# Patient Record
Sex: MALE | Race: WHITE | URBAN - METROPOLITAN AREA
[De-identification: names, ages, dates, MRNs, and addresses within clinical notes are randomized per-mention and may not be internally consistent; named-entity substitution may affect disease eponyms.]

---

## 2018-07-12 ENCOUNTER — OUTPATIENT (OUTPATIENT)
Dept: OUTPATIENT SERVICES | Facility: HOSPITAL | Age: 43
LOS: 1 days | Discharge: HOME | End: 2018-07-12

## 2018-07-13 ENCOUNTER — OUTPATIENT (OUTPATIENT)
Dept: OUTPATIENT SERVICES | Facility: HOSPITAL | Age: 43
LOS: 1 days | Discharge: HOME | End: 2018-07-13

## 2018-07-13 DIAGNOSIS — Z48.01 ENCOUNTER FOR CHANGE OR REMOVAL OF SURGICAL WOUND DRESSING: ICD-10-CM

## 2018-07-18 DIAGNOSIS — K05.6 PERIODONTAL DISEASE, UNSPECIFIED: ICD-10-CM

## 2022-06-27 PROBLEM — Z00.00 ENCOUNTER FOR PREVENTIVE HEALTH EXAMINATION: Status: ACTIVE | Noted: 2022-06-27

## 2023-01-13 ENCOUNTER — APPOINTMENT (OUTPATIENT)
Dept: INTERNAL MEDICINE | Facility: CLINIC | Age: 48
End: 2023-01-13

## 2023-04-07 ENCOUNTER — APPOINTMENT (OUTPATIENT)
Dept: INTERNAL MEDICINE | Facility: CLINIC | Age: 48
End: 2023-04-07

## 2023-12-13 ENCOUNTER — OUTPATIENT (OUTPATIENT)
Dept: OUTPATIENT SERVICES | Facility: HOSPITAL | Age: 48
LOS: 1 days | End: 2023-12-13
Payer: COMMERCIAL

## 2023-12-13 ENCOUNTER — APPOINTMENT (OUTPATIENT)
Dept: INTERNAL MEDICINE | Facility: CLINIC | Age: 48
End: 2023-12-13
Payer: COMMERCIAL

## 2023-12-13 VITALS
SYSTOLIC BLOOD PRESSURE: 141 MMHG | BODY MASS INDEX: 26.37 KG/M2 | DIASTOLIC BLOOD PRESSURE: 82 MMHG | HEART RATE: 66 BPM | HEIGHT: 68 IN | WEIGHT: 174 LBS | OXYGEN SATURATION: 100 % | TEMPERATURE: 97.6 F

## 2023-12-13 DIAGNOSIS — Z00.00 ENCOUNTER FOR GENERAL ADULT MEDICAL EXAMINATION WITHOUT ABNORMAL FINDINGS: ICD-10-CM

## 2023-12-13 DIAGNOSIS — Z00.00 ENCOUNTER FOR GENERAL ADULT MEDICAL EXAMINATION W/OUT ABNORMAL FINDINGS: ICD-10-CM

## 2023-12-13 DIAGNOSIS — F17.200 NICOTINE DEPENDENCE, UNSPECIFIED, UNCOMPLICATED: ICD-10-CM

## 2023-12-13 DIAGNOSIS — Z23 ENCOUNTER FOR IMMUNIZATION: ICD-10-CM

## 2023-12-13 PROCEDURE — 99203 OFFICE O/P NEW LOW 30 MIN: CPT

## 2023-12-13 PROCEDURE — 85027 COMPLETE CBC AUTOMATED: CPT

## 2023-12-13 PROCEDURE — 80061 LIPID PANEL: CPT

## 2023-12-13 PROCEDURE — 83036 HEMOGLOBIN GLYCOSYLATED A1C: CPT

## 2023-12-13 PROCEDURE — 84443 ASSAY THYROID STIM HORMONE: CPT

## 2023-12-13 PROCEDURE — 80053 COMPREHEN METABOLIC PANEL: CPT

## 2023-12-13 NOTE — REVIEW OF SYSTEMS
[Fever] : no fever [Fatigue] : no fatigue [Night Sweats] : no night sweats [Recent Change In Weight] : ~T no recent weight change [Discharge] : no discharge [Pain] : no pain [Redness] : no redness [Dryness] : no dryness [Vision Problems] : no vision problems [Itching] : no itching [Earache] : no earache [Hearing Loss] : no hearing loss [Nosebleeds] : no nosebleeds [Postnasal Drip] : no postnasal drip [Nasal Discharge] : no nasal discharge [Sore Throat] : no sore throat [Chest Pain] : no chest pain [Palpitations] : no palpitations [Lower Ext Edema] : no lower extremity edema [Orthopena] : no orthopnea [Paroxysmal Nocturnal Dyspnea] : no paroxysmal nocturnal dyspnea [Shortness Of Breath] : no shortness of breath [Wheezing] : no wheezing [Cough] : no cough [Dyspnea on Exertion] : not dyspnea on exertion [Abdominal Pain] : no abdominal pain [Nausea] : no nausea [Constipation] : no constipation [Diarrhea] : no diarrhea [Vomiting] : no vomiting [Heartburn] : no heartburn [Melena] : no melena [Dysuria] : no dysuria [Incontinence] : no incontinence [Hematuria] : no hematuria [Joint Pain] : no joint pain [Muscle Pain] : no muscle pain [Back Pain] : no back pain [Headache] : no headache [Dizziness] : no dizziness [Fainting] : no fainting [Unsteady Walk] : no ataxia [Insomnia] : no insomnia [Anxiety] : no anxiety [Depression] : no depression

## 2023-12-13 NOTE — HISTORY OF PRESENT ILLNESS
[FreeTextEntry1] : establishment of care  [de-identified] : 49 yo male patient with no psh and pmh, active smoker presenting for establishment of care. Patient has no complaints.   used.

## 2023-12-13 NOTE — PHYSICAL EXAM
[No Acute Distress] : no acute distress [Well Nourished] : well nourished [Well Developed] : well developed [Well-Appearing] : well-appearing [Normal Sclera/Conjunctiva] : normal sclera/conjunctiva [PERRL] : pupils equal round and reactive to light [Normal Outer Ear/Nose] : the outer ears and nose were normal in appearance [Normal Oropharynx] : the oropharynx was normal [No JVD] : no jugular venous distention [No Lymphadenopathy] : no lymphadenopathy [Supple] : supple [Thyroid Normal, No Nodules] : the thyroid was normal and there were no nodules present [No Respiratory Distress] : no respiratory distress  [No Accessory Muscle Use] : no accessory muscle use [Clear to Auscultation] : lungs were clear to auscultation bilaterally [Normal Rate] : normal rate  [Regular Rhythm] : with a regular rhythm [Normal S1, S2] : normal S1 and S2 [No Murmur] : no murmur heard [No Edema] : there was no peripheral edema [Soft] : abdomen soft [Non Tender] : non-tender [Non-distended] : non-distended [No HSM] : no HSM [Normal Bowel Sounds] : normal bowel sounds [No CVA Tenderness] : no CVA  tenderness [Coordination Grossly Intact] : coordination grossly intact [No Focal Deficits] : no focal deficits [Normal Gait] : normal gait [Normal Affect] : the affect was normal [Normal Insight/Judgement] : insight and judgment were intact

## 2023-12-13 NOTE — PLAN
[FreeTextEntry1] : 47 yo male patient presenting for establishment of care. -routine labs -flu vaccine given  -derm referral for warts - follow up in 6m or prn  -advised smoking cessation

## 2023-12-14 DIAGNOSIS — Z00.00 ENCOUNTER FOR GENERAL ADULT MEDICAL EXAMINATION WITHOUT ABNORMAL FINDINGS: ICD-10-CM

## 2024-01-26 LAB
ALBUMIN SERPL ELPH-MCNC: 4.8 G/DL
ALP BLD-CCNC: 102 U/L
ALT SERPL-CCNC: 42 U/L
ANION GAP SERPL CALC-SCNC: 10 MMOL/L
AST SERPL-CCNC: 33 U/L
BILIRUB SERPL-MCNC: 0.4 MG/DL
BUN SERPL-MCNC: 15 MG/DL
CALCIUM SERPL-MCNC: 9.6 MG/DL
CHLORIDE SERPL-SCNC: 103 MMOL/L
CHOLEST SERPL-MCNC: 272 MG/DL
CO2 SERPL-SCNC: 24 MMOL/L
CREAT SERPL-MCNC: 0.9 MG/DL
EGFR: 106 ML/MIN/1.73M2
ESTIMATED AVERAGE GLUCOSE: 126 MG/DL
GLUCOSE SERPL-MCNC: 97 MG/DL
HBA1C MFR BLD HPLC: 6 %
HCT VFR BLD CALC: 47 %
HDLC SERPL-MCNC: 38 MG/DL
HGB BLD-MCNC: 16.2 G/DL
LDLC SERPL CALC-MCNC: 166 MG/DL
MCHC RBC-ENTMCNC: 31.3 PG
MCHC RBC-ENTMCNC: 34.5 G/DL
MCV RBC AUTO: 90.7 FL
NONHDLC SERPL-MCNC: 234 MG/DL
PLATELET # BLD AUTO: 212 K/UL
PMV BLD: 9.8 FL
POTASSIUM SERPL-SCNC: 4.6 MMOL/L
PROT SERPL-MCNC: 7.1 G/DL
RBC # BLD: 5.18 M/UL
RBC # FLD: 11.9 %
SODIUM SERPL-SCNC: 137 MMOL/L
TRIGL SERPL-MCNC: 340 MG/DL
TSH SERPL-ACNC: 1.84 UIU/ML
WBC # FLD AUTO: 9.51 K/UL

## 2025-01-14 ENCOUNTER — EMERGENCY (EMERGENCY)
Facility: HOSPITAL | Age: 50
LOS: 0 days | Discharge: HOME | End: 2025-01-14
Attending: EMERGENCY MEDICINE

## 2025-01-14 VITALS
DIASTOLIC BLOOD PRESSURE: 108 MMHG | OXYGEN SATURATION: 100 % | RESPIRATION RATE: 16 BRPM | HEART RATE: 98 BPM | TEMPERATURE: 98 F | SYSTOLIC BLOOD PRESSURE: 172 MMHG | WEIGHT: 169.98 LBS

## 2025-01-14 VITALS
SYSTOLIC BLOOD PRESSURE: 161 MMHG | OXYGEN SATURATION: 99 % | RESPIRATION RATE: 18 BRPM | HEART RATE: 86 BPM | DIASTOLIC BLOOD PRESSURE: 101 MMHG

## 2025-01-14 PROCEDURE — 99282 EMERGENCY DEPT VISIT SF MDM: CPT

## 2025-01-14 PROCEDURE — 99283 EMERGENCY DEPT VISIT LOW MDM: CPT

## 2025-01-14 RX ORDER — POLYETHYLENE GLYCOL 3350 17 G/DOSE
17 POWDER (GRAM) ORAL ONCE
Refills: 0 | Status: DISCONTINUED | OUTPATIENT
Start: 2025-01-14 | End: 2025-01-14

## 2025-01-14 RX ORDER — POLYETHYLENE GLYCOL 3350 17 G/DOSE
17 POWDER (GRAM) ORAL
Qty: 1 | Refills: 0
Start: 2025-01-14

## 2025-01-14 RX ORDER — PSYLLIUM SEED (WITH SUGAR)
1 POWDER (GRAM) ORAL ONCE
Refills: 0 | Status: COMPLETED | OUTPATIENT
Start: 2025-01-14 | End: 2025-01-14

## 2025-01-14 RX ADMIN — Medication 1 PACKET(S): at 21:26
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
## 2025-01-14 NOTE — ED PROVIDER NOTE - PROGRESS NOTE DETAILS
FF: pt advised of strict return precautions discussed at bedside. agreeable to dc. f/u with gi and pcp.

## 2025-01-14 NOTE — ED PROVIDER NOTE - NSFOLLOWUPINSTRUCTIONS_ED_ALL_ED_FT
Nuestros coordinadores de derivaciones del [facility] se comunicarán con usted en las próximas 24 a 48 horas, de 9:00 a. m. a 5:00 p. m., para programar miguel coty de seguimiento. Espere miguel llamada telefónica del hospital en feliciano período de tiempo. Si no recibe miguel llamada o si tiene alguna pregunta o inquietud, puede comunicarse con ellos al  [phone]    POR FAVOR, AUMENTE GRIJALVA CONSUMO DE AGUA. TOME DOS [place] DE [place] DOS VECES AL DÍA HASTA QUE STEPHANY HECES ESTÉN BLANDAS Y LUEGO COMIENCE A TOMARLO MIGUEL VEZ AL DÍA.    ¿Qué es el estreñimiento?    Betty es un problema común que dificulta la evacuación intestinal. Las evacuaciones intestinales pueden ser:    ?Demasiado duras    ?Demasiado pequeñas    ?Difíciles de evacuar    ?Ocurrir menos de 3 veces por semana    ¿Qué causa el estreñimiento?    Puede ser causado por:    ?Efectos secundarios de algunos medicamentos    ?Katy alimentación    ?Enfermedades del sistema digestivo (figura 1)    ¿A qué otros síntomas will estar atento?    Estos síntomas pueden ser signos de un problema más grave:    ?Shahnaz en el inodoro o en el papel higiénico después de evacuar el intestino    ?Fiebre    ?Pérdida de peso    ?Sensación de debilidad    También puede ser un signo de un problema si tiene estreñimiento nuevo sin un cambio en stephany medicamentos o dieta, y nunca ha tenido estreñimiento en el pasado.    ¿Qué puedo hacer por mi cuenta?    Puede probar lo siguiente:    ?Coma alimentos que tengan mucha fibra. Algunas buenas opciones son las frutas, las verduras, el jugo de ciruelas pasas y los cereales (tabla 1).    ?Iqra mucha agua y otros líquidos.    ?Cuando sienta la necesidad de evacuar el intestino, vaya al baño. No se aguante.    ?Intente evacuar el intestino a primera hora de la mañana o poco después de miguel comida.    ?Cuando intente evacuar el intestino, ciertas posiciones pueden resultar útiles. Intente inclinarse ligeramente hacia adelante y usar un taburete o un reposapiés debajo de los pies.    ?Berry laxantes. Estos son medicamentos que ayudan a facilitar la evacuación del intestino. Algunos son pastillas que se tragan. Otros se introducen en el recto.    ?Realice actividad física con regularidad. Algunas personas encuentran que esto ayuda    ¿Cómo se trata el estreñimiento?    Depende de la causa del estreñimiento. En primer lugar, el médico le pedirá que intente comer más fibra y beber más agua. Si esto y los consejos anteriores no ayudan, es posible que le sugiera:    ?Medicamentos que se tragan o se colocan en el recto    ?Cambiar los medicamentos que carlton para otras afecciones    ?Usar "enemas": los enemas pueden ser solo de agua o pueden contener medicamentos para ayudar con el estreñimiento.    ?Biofeedback: esta es miguel técnica que le enseña a relajar los músculos para que pueda soltarlos y expulsar las heces.    ¿Cómo puedo ayudar a prevenir el estreñimiento?    Puede reducir las probabilidades de volver a sufrir estreñimiento si:    ?Sigue miguel dieta berry en fibra (tabla 1).    ?Vragas agua y otros líquidos erasmo el día. Slinger ayuda a mantener blandas las heces.    ? Establezca un horario regular para intentar evacuar el intestino. No ignore la necesidad de hacerlo.    ¿Cuándo will llamar al médico?    Llame a grijalva médico o enfermera para obtener asesoramiento si:    ? Stephany síntomas son nuevos o no son normales para usted.    ? No evacua el intestino erasmo unos días.    ? El estreñimiento aparece y desaparece, mateus dura más de 3 semanas.    ? Tiene mucho dolor.    ? Tiene otros síntomas que también le preocupan, devonte sangrado, debilidad, pérdida de peso o fiebre.    ? Otras personas de grijalva jeromy plaza tenido cáncer colorrectal o enfermedad inflamatoria intestinal.      Dieta con alto contenido de fibra    LO QUE NECESITA SABER:    ¿Qué es miguel dieta con alto contenido de fibra?Miguel dieta con alto contenido de fibra incluye alimentos con miguel devon cantidad de fibra. La fibra es la parte de las frutas, los vegetales y los granos, que no se descompone al ser ingerida por grijalva cuerpo. La fibra ayuda a regular las evacuaciones intestinales. La fibra además ayuda a bajar el colesterol, controlar la glucosa en la shahnaz en las personas que sufren de diabetes y para aliviar el estreñimiento. También la fibra podría ayudarle a controlar grijalva peso debido a que le da la sensación de llenura más rápidamente. La mayoría de los adultos deberían consumir entre 25 a 35 gramos de fibra al día. Consulte con grijalva dietista o médico sobre la cantidad de fibra que usted necesita.    ¿Cuáles alimentos son miguel buena guillermo de fibra?      Alimentos que contienen al menos 4 gramos de fibra por porción:  ? a ½ de miguel taza de cereal con alto contenido de fibra (erik la etiqueta nutricional en la caja)    ½ taza de moras o frambuesas    4 ciruelas pasas    1 alcachofa cocida    ½ taza de legumbres cocidas, devonte lentejas o frijoles rojos o pintos    Alimentos que contienen 1 a 3 gramos de fibra por porción:  1 rebanada de pan de adelaida integral, pan integral de haines o pan de haines    ½ taza de arroz integral cocido    4 galletas integrales    1 taza de yulia    ½ taza de cereal con 1 a 3 gramos de fibra por porción (erik la etiqueta nutricional en la caja)    1 porción pequeña de fruta devonte manzana, banana, jessica, kiwi o naranja    3 dátiles    ½ taza de albaricoques enlatados, ensalada de frutas, duraznos o peras    ½ taza de verduras crudas o cocidas, devonte zanahorias, coliflor, repollo, espinaca, calabaza o maíz  ¿Cuáles son las formas que puedo aumentar la fibra en mi dieta?    Escoja arroz integral o gorge en vez de arroz leigh.    Use harina de grano integral en las recetas en vez de harina cyrus.    Añada legumbres y arvejas a los guisos o las sopas.    Escoja fruta y verduras frescas con la cascara en vez de jugos.  ¿Qué otras pautas will seguir?    Añada fibra a grijalva dieta lentamente.Usted podría presentar malestar o inflamación estomacal y gases si añade fibra a grijalva dieta demasiado rápido.    Iqra mucho líquido a medida que agrega fibra a grijalva dieta.Es posible que tenga náuseas o desarrolle estreñimiento si no carlton suficiente agua. Pregunte cuánto líquido debe eufemia cada día y cuáles líquidos son los más adecuados para usted.  ACUERDOS SOBRE GRIJALVA CUIDADO:    Usted tiene el derecho de ayudar a planear grijalva cuidado. Discuta stephany opciones de tratamiento con stephany médicos para decidir el cuidado que usted desea recibir. Usted siempre tiene el derecho de rechazar el tratamiento.    Hipertensión en los adultos  Hypertension, Adult  La presión arterial devon (hipertensión) se produce cuando la fuerza de la shahnaz bombea a través de las arterias con mucha fuerza. Las arterias son los vasos sanguíneos que transportan la shahnaz desde el corazón al tr del cuerpo. La hipertensión hace que el corazón salome más esfuerzo para bombear shahnaz y puede provocar que las arterias se estrechen o endurezcan. La hipertensión no tratada o no controlada puede causar infarto de miocardio, insuficiencia cardíaca, accidente cerebrovascular, enfermedad renal y otros problemas.    Miguel lectura de la presión arterial consta de un número más alto sobre un número más bajo. En condiciones ideales, la presión arterial debe estar por debajo de 120/80. El primer número (“superior”) es la presión sistólica. Es la medida de la presión de las arterias cuando el corazón late. El barrett número (“inferior”) es la presión diastólica. Es la medida de la presión en las arterias cuando el corazón se relaja.    ¿Cuáles son las causas?  Se desconoce la causa exacta de esta afección. Hay algunas afecciones que causan presión arterial devon.    ¿Qué incrementa el riesgo?  Ciertos factores pueden hacer que miguel persona sea más propensa a desarrollar presión arterial devon. Algunos de estos factores de riesgo están bajo grijalva control, por ejemplo, los siguientes:  Fumar.  No hacer la cantidad suficiente de actividad física o ejercicio.  Tener sobrepeso.  Consumir mucha grasa, azúcar, calorías o sal (sodio) en grijalva dieta.  Beber alcohol en exceso.  Otros factores de riesgo son los siguientes:  Tener antecedentes personales de enfermedad cardíaca, diabetes, colesterol alto o enfermedad renal.  Estrés.  Tener antecedentes familiares de presión arterial devon y colesterol alto.  Tener apnea obstructiva del sueño.  Edad. El riesgo aumenta con la edad.  ¿Cuáles son los signos o síntomas?  Es posible que la presión arterial devon no cause síntomas. La presión arterial muy devon (crisis hipertensiva) puede provocar:  Dolor de paula.  Latidos cardíacos acelerados o irregulares (palpitaciones).  Falta de aire.  Hemorragia nasal.  Náuseas y vómitos.  Cambios en la visión.  Dolor intenso en el pecho, mareos y convulsiones.  ¿Cómo se diagnostica?  Esta afección se diagnostica al medir grijalva presión arterial mientras se encuentra sentado, con el brazo apoyado sobre miugel superficie plana, las piernas sin cruzar y los pies yecenia apoyados en el piso. El brazalete del tensiómetro debe colocarse directamente sobre la piel de la parte superior del brazo y al nivel de grijalva corazón. La presión arterial debe medirse al menos dos veces en el mismo brazo. Determinadas condiciones pueden causar miguel diferencia de presión arterial entre el brazo shailesh y el derecho.    Si tiene miguel lectura de presión arterial devon erasmo miguel visita o si tiene presión arterial normal con otros factores de riesgo, se le podrá pedir que salome lo siguiente:  Que regrese otro día para volver a controlar grijalva presión arterial nuevamente.  Que se controle la presión arterial en grijalva casa erasmo 1 semana o más.  Si se le diagnostica hipertensión, es posible que se le realicen otros análisis de shahnaz o estudios de diagnóstico por imágenes para ayudar a grijalva médico a comprender grijalva riesgo general de tener otras afecciones.    ¿Cómo se trata?  Esta afección se trata haciendo cambios saludables en el estilo de tesfaye, tales devonte ingerir alimentos saludables, realizar más ejercicio y reducir el consumo de alcohol. Es posible que lo deriven para que reciba asesoramiento sobre miguel dieta saludable y actividad física.    El médico puede recetarle medicamentos si los cambios en el estilo de tesfaye no son suficientes para lograr controlar la presión arterial y si:  Grijalva presión arterial sistólica está por encima de 130.  Grijalva presión arterial diastólica está por encima de 80.  La presión arterial deseada puede variar en función de las enfermedades, la edad y otros factores personales.    Siga estas instrucciones en grijalva casa:  Comida y bebida    A plate with examples of foods in a healthy diet.  Siga miguel dieta con alto contenido de fibras y potasio, y con bajo contenido de sodio, azúcar agregada y grasas. Un ejemplo de betty plan de alimentación se denomina dieta DASH. DASH es la sigla en inglés de “Enfoques alimentarios para detener la hipertensión”. Para alimentarse de esta manera:  Coma mucha fruta y verdura fresca. Trate de que la mitad del plato de cada comida sea de frutas y verduras.  Coma cereales integrales, devonte pasta integral, arroz integral o pan integral. Llene aproximadamente un cuarto del plato con cereales integrales.  Coma y iqra productos lácteos con bajo contenido de grasa, devonte leche descremada o yogur bajo en grasas.  Evite la ingesta de bess de carne grasa, carne procesada o curada, y carne de ave con piel. Llene aproximadamente un cuarto del plato con proteínas magras, devonte pescado, alayna sin piel, frijoles, huevos o tofu.  Evite ingerir alimentos prehechos y procesados. En general, estos tienen mayor cantidad de sodio, azúcar agregada y grasa.  Reduzca grijalva ingesta diaria de sodio. Muchas personas que tienen hipertensión deben comer menos de 1,500 mg de sodio por día.  No iqra alcohol si:  Grijalva médico le indica no hacerlo.  Está embarazada, puede estar embarazada o está tratando de quedar embarazada.  Si vargas alcohol:  Limite la cantidad que vargas a lo siguiente:  De 0 a 1 medida por día para las mujeres.  De 0 a 2 medidas por día para los hombres.  Sepa cuánta cantidad de alcohol hay en las bebidas que carlton. En los [place], miguel medida equivale a miguel botella de cerveza de 12 oz (355 ml), un vaso de vino de 5 oz (148 ml) o un vaso de miguel bebida alcohólica de devon graduación de 1½ oz (44 ml).  Estilo de tesfaye    A blood pressure monitor and cuff.   Trabaje con grijalva médico para mantener un peso saludable o perder peso. Pregúntele cuál es el peso recomendado para usted.  Realice al menos 30 minutos de ejercicio que salome que se acelere grijalva corazón (ejercicio aeróbico) la mayoría de los días de la semana. Estas actividades pueden incluir caminar, nadar o andar en bicicleta.  Incluya ejercicios para fortalecer stephany músculos (ejercicios de resistencia), devonte [place] o levantamiento de pesas, devonte parte de grijalva rutina semanal de ejercicios. Intente realizar 30 minutos de betty tipo de ejercicios al menos lissy días a la semana.  No consuma ningún producto que contenga nicotina o tabaco. Estos productos incluyen cigarrillos, tabaco para mascar y aparatos de vapeo, devonte los cigarrillos electrónicos. Si necesita ayuda para dejar de fumar, consulte al médico.  Contrólese la presión arterial en grijalva casa según las indicaciones del médico.  Concurra a todas las visitas de seguimiento. Slinger es importante.  Medicamentos    Use los medicamentos de venta deni y los recetados solamente devonte se lo haya indicado el médico. Siga cuidadosamente las indicaciones. Los medicamentos para la presión arterial deben tomarse según las indicaciones.  No omita las dosis de medicamentos para la presión arterial. Si lo hace, estará en riesgo de tener problemas y puede hacer que los medicamentos neville menos eficaces.  Pregúntele a grijalva médico a qué efectos secundarios o reacciones a los medicamentos debe prestar atención.  Comuníquese con un médico si:  Piensa que tiene miguel reacción a un medicamento que está tomando.  Tiene kristi de paula frecuentes (recurrentes).  Se siente mareado.  Tiene hinchazón en los tobillos.  Tiene problemas de visión.  Solicite ayuda inmediatamente si:  Siente un dolor de paula intenso o confusión.  Siente debilidad inusual o adormecimiento.  Siente que va a desmayarse.  Siente un dolor intenso en el pecho o el abdomen.  Vomita repetidas veces.  Tiene dificultad para respirar.  Estos síntomas pueden indicar miguel emergencia. Solicite ayuda de inmediato. Llame al 911.  No espere a jackie si los síntomas desaparecen.  No conduzca por stephany propios medios hasta el hospital.  Resumen  La hipertensión se produce cuando la shahnaz bombea en las arterias con mucha fuerza. Si esta afección no se controla, podría correr riesgo de tener complicaciones graves.  La presión arterial deseada puede variar en función de las enfermedades, la edad y otros factores personales. Para la mayoría de las personas, miguel presión arterial normal es macho que 120/80.  La hipertensión se trata con cambios en el estilo de tesfaye, medicamentos o miguel combinación de ambos. Los cambios en el estilo de tesfaye incluyen pérdida de peso, ingerir alimentos sanos, seguir miguel dieta baja en sodio, hacer más ejercicio y limitar el consumo de alcohol.  Esta información no tiene devonte fin reemplazar el consejo del médico. Asegúrese de hacerle al médico cualquier pregunta que tenga.    Plan de alimentación con "enfoque dietético para detener la hipertensión” (DASH, por stephany siglas en inglés)    LO QUE NECESITA SABER:    ¿Qué es el plan de alimentación con enfoque dietético para detener la hipertensión (DASH, por stephany siglas en inglés)?El plan de alimentación DASH está diseñado para ayudar a prevenir o disminuir la hipertensión. También puede ayudar a bajar el colesterol precious (colesterol LDL) y disminuir grijalva riesgo de enfermedad cardíaca. El plan es bajo en sodio, azúcar, grasas dañinas, y grasas en grijalva totalidad. Es alto en potasio, calcio, magnesio y fibra. Estos nutrientes se agregan al consumir más frutas, vegetales y granos enteros. Con el plan de alimentación DASH, usted necesita consumir un número específico de porciones de cada gaby de alimentos. Slinger le ayudará a consumir las cantidades suficientes de ciertos nutrientes y limitar otros. La cantidad de porciones que usted debe comer depende de la cantidad de calorías que usted necesita. Grijalva dietista puede ayudarlo a crear planes de comidas con la cantidad adecuada de porciones para cada gaby de alimentos.      ¿Qué necesito saber acerca del sodio?Grijalva dietista le indicará la cantidad de sodio que usted debe consumir a diario. La gente que tiene la presión arterial devon debe consumir, devonte soledad, de 1,500 a 2,300 mg de sodio al día. Miguel cucharadita (cdta) de sal contiene 2,300 mg de sodio. Slinger puede parecer devonte miguel meta difícil, mateus pequeños cambios en los alimentos que usted consume pueden hacer miguel gran diferencia. Grijalva médico o dietista puede ayudarlo a crear un plan alimenticio que cumpla grijalva límite de sodio.    Erik las etiquetas de los alimentos.Las etiquetas pueden ayudarle a escoger alimentos bajos en sodio. La cantidad de sodio está incluida en miligramos (mg). La columna del porcentaje de valor diario indica la cantidad de necesidades diarias satisfechas con 1 porción del alimento para cada nutriente en la lista. Escoja alimentos que tengan menos de 5% de la dosis diaria de sodio. Estos alimentos se consideran bajos en sodio. Los alimentos que tienen 20% o más de la dosis diaria de sodio se consideran alimentos altos en sodio. Evite alimentos que tengan más de 300 mg de sodio por porción. Escoja alimentos cuya etiqueta diga que son bajos en sodio, con sodio reducido, o sin sal agregada.        Limite la sal agregada.No sale la comida en la demarco si se añade sal al cocinar. Use hierbas y condimentos, devonte cebollas, ajo y especias sin sal para agregar sabor. Use jugo de lima, karl o vinagre para agregar un sabor ácido. Use chiles picantes o miguel cantidad pequeña de salsa picante para agregar un sabor picante. Limite los alimentos con alto contenido de sal agregada, devonte los siguientes:  Condimentos hechos con sal, devonte sal de ajo, sal de apio, sal de cebolla, sal condimentada, suavizantes para tremaine, y glutamato de monosodio (MSG, por stephany siglas en inglés)    Sopa Miso y mezclas para sopa enlatadas o secas    Salsa de soya regular, salsa de barbacoa, salsa teriyaki, salsa para bistec, salsa [place], y la mayoría de las vinagres con sabor    Alimentos para merendar, devonte che tostadas, palomitas de maíz, pretzels, piel de cerdo, galletas de soda saladas, y nueces saladas    Alimentos congelados, devonte cenas, entradas, vegetales en salsa, y tremaine empanizadas    Pregunte sobre los sustitutos para la sal.Pregúntele a grijalva médico si es posible usar sustitutos de la sal. Algunos sustitutos de la sal vienen con ingredientes que pueden ser dañinos si usted tiene ciertos padecimientos médicos.    Escoja los alimentos cuidadosamente cuando sale a comer a restaurantes:las comidas de los restaurantes, sobre todo restaurantes de comida rápida, tena siempre son altas en sodio. Algunos restaurantes ofrecen información nutricional que indica la cantidad de sodio en stephany alimentos. Pida que preparen stephany comidas con menos sal o sin sal.  ¿Qué necesito saber acerca de las grasas?Las grasas insaturadas y los ácidos grasos omega-3 son ejemplos de grasas saludables. Las grasas no saludables incluyen las grasas saturadas y las grasas trans.    Incluya grasas saludables, devonte las siguientes:  Aceites de cocina, devonte el de soja, canola, neves o girasol    Pescados grasos, devonte el salmón, el atún, la caballa o las sergio    Aceite de linaza o linaza molida    ½ taza de frijoles cocidos, devonte frijoles negros, frijoles rojos o frijoles pintos    1½ onzas de omar secos bajos en sodio, devonte almendras o nueces    [place] de maní baja en azúcar y sodio    Semillas, devonte las de chía o girasol  [place] de Omega 3    Limite o no consuma grasas poco saludables, devonte los siguientes:  Alimentos que contienen grasa de origen animal, devonte las tremaine grasas, la leche entera, la mantequilla y la crema    [place], margarina en tamika, aceite de bullard y aceite de jerald.    Aderezo de ensalada completo o cremoso    Sopa cremosa    Galletas, che fritas y productos de panadería elaborados con margarina o manteca    Alimentos fritos con grasas poco saludables    Salsa de carne y salsas, devonte la [place] o la de queso  ¿Qué necesito saber acerca de los carbohidratos?Todos los carbohidratos se descomponen en azúcar. Los carbohidratos complejos contienen más fibra que los simples. Slinger significa que los carbohidratos complejos entran en el torrente sanguíneo más lentamente y causan menos picos de azúcar en la shahnaz. Intenta incluir más carbohidratos complejos y menos carbohidratos simples.    Incluya carbohidratos complejos, devonte los siguientes:  1 tajada de pan integral    1 onza de cereal seco que no contenga azúcar añadida    ½ taza de yulia cocida    2 onzas de pasta integral cocida    ½ taza de arroz integral cocido    Limite o no consuma carbohidratos simples, devonte los siguientes:  Productos horneados, devonte rosquillas, pasteles y galletas    Mezclas para pan de maíz y galletas    Arroz leigh y mezclas de pasta, devonte los macarrones con queso de caja    Cereales instantáneos y fríos que contienen azúcar    Jalea, mermelada y helado que contienen azúcar    Condimentos, devonte el ketchup    Bebidas con alto contenido en azúcar, devonte refrescos, limonadas y jugos de frutas  ¿Qué necesito saber acerca de las verduras y frutas?Las verduras y las frutas pueden ser frescas, congeladas o enlatadas. Si es posible, trate de elegir opciones enlatadas bajas en sodio.    Incluya miguel variedad de verduras y frutas, devonte las siguientes:  1 manzana, jessica o melocotón medianos (aproximadamente ½ taza picada)    ½ banana pequeña    ½ taza de bayas, devonte arándanos, fresas o moras    1 taza de verduras de hoja greyson crudas, devonte jameson, espinacas, col rizada o berza    ½ taza de verduras congeladas o enlatadas (sin sal agregada), devonte judías verdes    ½ taza de fruta fresca, congelada o enlatada (enlatada en sirope liviano o jugo de fruta)    ½ taza de jugo de verduras o frutas    Limite o no consuma verduras y frutas elaboradas de las siguientes maneras:  Fruta congelada, devonte las cerezas, con azúcar añadida    Frutas en crema o salsa de mantequilla    Las verduras enlatadas son altas en sodio.    [place], vegetales en escabeche, y otros alimentos preparados con vinagre    Vegetales fritos o vegetales en mantequilla o salsas altas en grasas  ¿Qué necesito saber acerca de los alimentos con proteína?    Incluya alimentos proteicos magros o bajos en grasa, devonte los siguientes:  Tremaine dillon (alayna, pavo) sin piel    Pescado (sobre todo pescado con grasa, devonte salmón, atún fresco o caballa)    Carne de res o de cerdo magra (lomo, carne molida extra magra)    Claras de huevo y sustitutos del huevo    1 taza de leche descremada o leche con 1% de grasa    1½ onzas de queso descremado o bajo en grasas    6 onzas de yogurt descremado o bajo en grasas    Limite o no consuma alimentos con alto contenido de proteínas, devonte los siguientes  Tremaine ahumadas o curadas, devonte carne preparada con maíz, tocineta, jamón, perros calientes, y salchichas    Frijoles enlatados y tremaine enlatadas o en pasta, devonte tremaine en conserva, sergio, anchoas y mariscos de imitación    Tremaine para emparedado, devonte mortadela, jamón, pavo, y carne en rebanada    Tremaine altas en grasas (biste estilo T-bone, carne molida para hamburguesas, costillas)    Huevos enteros y yemas de huevo    Leche entera, leche con 2% de grasa y crema    Queso normal y queso fundido  ¿Qué otras pautas will seguir?    Mantenga un peso saludable.Grijalva riesgo de enfermedad cardíaca es aún más alto si tiene sobrepeso. Pregúntele a grijalva médico cuál es el peso ideal para usted. Grijalva médico puede sugerirle que pierda peso. Usted puede perder peso si se propone consumir menos calorías y alimentos que tengan azúcar y grasas agregadas. El plan de alimentación DASH puede ayudarle a lograrlo. Miguel buena forma de disminuir el consumo de calorías es consumiendo porciones más pequeñas en cada comida y menos meriendas entre comidas. Consulte a grijalva médico para obtener más información sobre cómo adelgazar.    Realice actividad física con regularidad.El ejercicio regular puede ayudarle a alcanzar o mantener un peso saludable. El ejercicio regular también puede ayudarle a disminuir grijalva presión arterial y mejorar stephany niveles de colesterol. Salome ejercicios moderados por 30 minutos o más todos los días de la semana. Para bajar peso, asegúrese de ejercitarse por lo menos 60 minutos. Consulte con grijalva médico sobre un programa de ejercicio adecuado para usted.  JEROMY ASIÁTICA CAMINANDO DEVONTE EJERCICIO      Limite el consumo de alcohol.Las mujeres deberían limitar el consumo de alcohol a 1 bebida por día. Los hombres deberían limitar el consumo de alcohol a 2 tragos al día. Un trago equivale a 12 onzas de cerveza, 5 onzas de vino o 1 onza y ½ de licor.  ¿Dónde puedo obtener más información?    [facility]  [address] [address]  [place] [address]  Phone: [phone]  Web Address: http://www.nhlbi.nih.gov/health/infoctr/index.htm  ACUERDOS SOBRE GRIJALVA CUIDADO:    Usted tiene el derecho de ayudar a planear grijalva cuidado. Discuta stephany opciones de tratamiento con stephany médicos para decidir el cuidado que usted desea recibir. Usted siempre tiene el derecho de rechazar el tratamiento.

## 2025-01-14 NOTE — ED PROVIDER NOTE - PHYSICAL EXAMINATION
Physical Exam    Vital Signs: I have reviewed the initial vital signs.  Constitutional: well-nourished, appears stated age, no acute distress  Eyes: Conjunctiva pink, Sclera clear  Cardiovascular: regular rate, regular rhythm, well-perfused extremities, radial pulses equal and 2+ b/l.   Respiratory: unlabored respiratory effort, clear to auscultation bilaterally no wheezing, rales and rhonchi. pt is speaking full sentences. no accessory muscle use.   Gastrointestinal: soft, non-tender, nondistended abdomen, no pulsatile mass, no rebound, no guarding, negative psoas, no cva tenderness  Musculoskeletal: FROM of b/l upper and lower extremities  Integumentary: warm, dry, no rash  Neurologic: awake, alert, steady gait.   Psychiatric: appropriate mood, appropriate affect

## 2025-01-14 NOTE — ED PROVIDER NOTE - NSFOLLOWUPCLINICS_GEN_ALL_ED_FT
[facility]  Medicine  [address], [place]   Phone: [phone]  Fax:   Follow Up Time: 7-10 Days     [facility]  Medicine  [address], [place]   Phone: [phone]  Fax:   Follow Up Time: 7-10 Days

## 2025-01-14 NOTE — ED PROVIDER NOTE - PATIENT PORTAL LINK FT
You can access the FollowMyHealth Patient Portal offered by [facility] by registering at the following website: [email or web address]. By joining [employer]’s FollowMyHealth portal, you will also be able to view your health information using other applications (apps) compatible with our system. You can access the FollowMyHealth Patient Portal offered by [facility] by registering at the following website: [email or web address]. By joining [employer]’s FollowMyHealth portal, you will also be able to view your health information using other applications (apps) compatible with our system.

## 2025-01-14 NOTE — ED PROVIDER NOTE - CLINICAL SUMMARY MEDICAL DECISION MAKING FREE TEXT BOX
49-year-old man complains of constipation without associated nausea, vomiting, abdominal pain.  Passing gas as usual.  Took 1 dose of MiraLAX yesterday with no relief.  Vital signs, exam as noted, patient is very well-appearing.  Abdomen soft, nontender, nondistended.  Doubt inflammatory etiology, likely simple constipation.  Of note, patient is hypertensive in the ED.  No known history, but patient does not follow-up regularly with PMD.  Does report feeling a little bit anxious since he has been in the ED, but denies any, visual changes, or other symptoms.  Spoke at length with patient using , recommended increased fluid intake, MiraLAX, and GI follow-up; also advised PMD follow-up to address blood pressure.  Patient is comfortable with plan.

## 2025-01-16 NOTE — CHART NOTE - NSCHARTNOTEFT_GEN_A_CORE
2024    From the office of:   Zeus Scales MD   [facility]  [id number] [facility]  [facility] [address]  Phone: [phone]    In regards to Finesse Verdugo Sr, :  1940    From: Finesse Verdugo Sr  To: Zeus Scales  Sent: 2024  5:41 PM CST  Subject: INSOMNIA !!    Hello Dr. Longo ,  For the most part I am ok BUT !  I have been having trouble sleeping for about three months. Also a lot of lower leg cramps .  I have reduced the prednisone to one 5mg pill am and one pill pm.   I get about four hrs of sleep per day . I have tried Excedrin PM to no avail.   I don't want to be a whiner !  But I really need more sleep   PLEASE ADVISE !           left vm 1/15 - DK / left vm 1/16 - DK (PCP & Gastro Referral)